# Patient Record
Sex: MALE | Race: WHITE | NOT HISPANIC OR LATINO | Employment: UNEMPLOYED | URBAN - METROPOLITAN AREA
[De-identification: names, ages, dates, MRNs, and addresses within clinical notes are randomized per-mention and may not be internally consistent; named-entity substitution may affect disease eponyms.]

---

## 2024-09-17 ENCOUNTER — APPOINTMENT (OUTPATIENT)
Dept: CARDIOLOGY | Facility: HOSPITAL | Age: 69
End: 2024-09-17
Payer: MEDICARE

## 2024-09-26 ENCOUNTER — HOSPITAL ENCOUNTER (OUTPATIENT)
Dept: CARDIOLOGY | Facility: HOSPITAL | Age: 69
Discharge: HOME | End: 2024-09-26
Payer: MEDICARE

## 2024-09-26 DIAGNOSIS — B94.8 SEQUELAE OF OTHER SPECIFIED INFECTIOUS AND PARASITIC DISEASES: ICD-10-CM

## 2024-09-26 DIAGNOSIS — U07.1 COVID-19: ICD-10-CM

## 2024-09-26 LAB
AORTIC VALVE MEAN GRADIENT: 3 MMHG
AORTIC VALVE PEAK VELOCITY: 1.14 M/S
AV PEAK GRADIENT: 5.2 MMHG
AVA (PEAK VEL): 3 CM2
AVA (VTI): 2.79 CM2
EJECTION FRACTION APICAL 4 CHAMBER: 63.3
EJECTION FRACTION: 60 %
LEFT ATRIUM VOLUME AREA LENGTH INDEX BSA: 17.5 ML/M2
LEFT VENTRICLE INTERNAL DIMENSION DIASTOLE: 4.5 CM (ref 3.5–6)
LEFT VENTRICULAR OUTFLOW TRACT DIAMETER: 2.1 CM
LV EJECTION FRACTION BIPLANE: 60 %
MITRAL VALVE E/A RATIO: 0.74
MITRAL VALVE E/E' RATIO: 5.6
TRICUSPID ANNULAR PLANE SYSTOLIC EXCURSION: 2.8 CM

## 2024-09-26 PROCEDURE — 93306 TTE W/DOPPLER COMPLETE: CPT

## 2024-09-26 PROCEDURE — 2500000004 HC RX 250 GENERAL PHARMACY W/ HCPCS (ALT 636 FOR OP/ED): Performed by: INTERNAL MEDICINE

## 2024-09-26 PROCEDURE — 93306 TTE W/DOPPLER COMPLETE: CPT | Performed by: INTERNAL MEDICINE

## 2024-09-30 ENCOUNTER — TELEPHONE (OUTPATIENT)
Dept: CARDIOLOGY | Facility: HOSPITAL | Age: 69
End: 2024-09-30
Payer: MEDICARE

## 2024-09-30 ENCOUNTER — TELEPHONE (OUTPATIENT)
Dept: GASTROENTEROLOGY | Facility: CLINIC | Age: 69
End: 2024-09-30
Payer: MEDICARE

## 2024-10-07 ENCOUNTER — TELEPHONE (OUTPATIENT)
Dept: CARDIOLOGY | Facility: HOSPITAL | Age: 69
End: 2024-10-07
Payer: MEDICARE

## 2024-10-07 NOTE — TELEPHONE ENCOUNTER
Called and LVM asking if the pt would like to be seen sooner and togive us a call back if he is interested.    Amador PHILLIPS

## 2024-10-08 ENCOUNTER — TELEPHONE (OUTPATIENT)
Dept: GASTROENTEROLOGY | Facility: CLINIC | Age: 69
End: 2024-10-08
Payer: MEDICARE

## 2024-10-08 NOTE — TELEPHONE ENCOUNTER
----- Message from Addie RODRIGUEZ sent at 10/8/2024 11:06 AM EDT -----  Regarding: RE: OPEN ACCESS  Jeanne Phillips,   Can you review the Chart for this Patient? He has an appointment with Dr. Marx on 10.17.24 for an abnormal ECHO. His referral is in the Media section of the chart. ECHO was done by Dr. Reed on 9.7.24 in the Cardiology section. Thank you so much!  Tim  ----- Message -----  From: Adore Savage RN  Sent: 10/7/2024   2:50 PM EDT  To: [id number]er[facility]  Subject: OPEN ACCESS                                      [facility]

## 2024-10-10 ENCOUNTER — APPOINTMENT (OUTPATIENT)
Dept: UROLOGY | Facility: CLINIC | Age: 69
End: 2024-10-10
Payer: MEDICARE

## 2024-10-10 VITALS
HEART RATE: 75 BPM | SYSTOLIC BLOOD PRESSURE: 190 MMHG | DIASTOLIC BLOOD PRESSURE: 111 MMHG | WEIGHT: 240 LBS | BODY MASS INDEX: 33.6 KG/M2 | HEIGHT: 71 IN

## 2024-10-10 DIAGNOSIS — R97.20 ELEVATED PSA: ICD-10-CM

## 2024-10-10 DIAGNOSIS — N40.1 BENIGN PROSTATIC HYPERPLASIA WITH LOWER URINARY TRACT SYMPTOMS, SYMPTOM DETAILS UNSPECIFIED: Primary | ICD-10-CM

## 2024-10-10 LAB
POC BILIRUBIN, URINE: NEGATIVE
POC BLOOD, URINE: NEGATIVE
POC GLUCOSE, URINE: NEGATIVE MG/DL
POC KETONES, URINE: ABNORMAL MG/DL
POC LEUKOCYTES, URINE: NEGATIVE
POC NITRITE,URINE: NEGATIVE
POC PH, URINE: 7 PH
POC PROTEIN, URINE: NEGATIVE MG/DL
POC SPECIFIC GRAVITY, URINE: 1.02
POC UROBILINOGEN, URINE: 0.2 EU/DL

## 2024-10-10 PROCEDURE — 81003 URINALYSIS AUTO W/O SCOPE: CPT | Performed by: UROLOGY

## 2024-10-10 PROCEDURE — 1159F MED LIST DOCD IN RCRD: CPT | Performed by: UROLOGY

## 2024-10-10 PROCEDURE — 99203 OFFICE O/P NEW LOW 30 MIN: CPT | Performed by: UROLOGY

## 2024-10-10 NOTE — PROGRESS NOTES
10/10/2024  68-year-old gentleman presents an elevated PSA.  Voiding well    History of kidney stone status post of lithotripsy    Exam: Circumcised normal penis normal testes bilaterally    DENIS: 1+, no nodule    We discussed elevated PSA, prostate biopsy versus monitoring PSA  We discussed benign prostate hypertrophy with mild voiding symptom  We discussed the history of kidney stone  All the questions were answered, the patient expressed understanding and agreed to the plan.    Impression  Elevated PSA  BPH  History of kidney stone    Plan  Conservative management for now  Repeat PSA in 6 months  Watch voiding  Appointment in 6 months  Possible prostate biopsy in the future    Chief Complaint   Patient presents with    Elevated PSA     Patient is here today For new patient visit for elevated psa per the request of Florencia Garay at [facility] in [place] .        Physical Exam     TODAYS LAB RESULTS:  Last psa was 9-30-24 was 5.30    Glucose, Urine  NEGATIVE mg/dl NEGATIVE   POC Bilirubin, Urine  NEGATIVE NEGATIVE   POC Ketones, Urine  NEGATIVE mg/dl TRACE Abnormal    POC Specific Gravity, Urine  1.005 - 1.035 1.020   POC Blood, Urine  NEGATIVE NEGATIVE   POC PH, Urine  No Reference Range Established PH 7.0   POC Protein, Urine  NEGATIVE, 30 (1+) mg/dl NEGATIVE   POC Urobilinogen, Urine  0.2, 1.0 EU/DL 0.2   Poc Nitrite, Urine  NEGATIVE NEGATIVE   POC Leukocytes, Urine  NEGATIVE NEGATIVE                    ASSESSMENT&PLAN:      IMPRESSIONS:    PSA   9-30-24 was 5.30

## 2024-10-17 ENCOUNTER — OFFICE VISIT (OUTPATIENT)
Dept: CARDIOLOGY | Facility: HOSPITAL | Age: 69
End: 2024-10-17
Payer: MEDICARE

## 2024-10-17 VITALS
HEART RATE: 74 BPM | SYSTOLIC BLOOD PRESSURE: 138 MMHG | DIASTOLIC BLOOD PRESSURE: 90 MMHG | BODY MASS INDEX: 33.6 KG/M2 | HEIGHT: 71 IN | OXYGEN SATURATION: 98 % | WEIGHT: 240 LBS

## 2024-10-17 DIAGNOSIS — R42 DIZZINESS: ICD-10-CM

## 2024-10-17 DIAGNOSIS — I51.9 LEFT VENTRICULAR DIASTOLIC DYSFUNCTION: Primary | ICD-10-CM

## 2024-10-17 DIAGNOSIS — I10 BENIGN ESSENTIAL HYPERTENSION: ICD-10-CM

## 2024-10-17 PROBLEM — M19.90 ARTHRITIS: Status: ACTIVE | Noted: 2024-10-17

## 2024-10-17 PROBLEM — I25.10 CORONARY ARTERIOSCLEROSIS: Status: ACTIVE | Noted: 2024-10-17

## 2024-10-17 PROBLEM — H26.9 CATARACT: Status: ACTIVE | Noted: 2024-10-17

## 2024-10-17 PROBLEM — J30.9 ALLERGIC RHINITIS: Status: ACTIVE | Noted: 2024-10-17

## 2024-10-17 PROBLEM — E87.6 HYPOKALEMIA: Status: ACTIVE | Noted: 2024-10-17

## 2024-10-17 PROBLEM — E78.5 HYPERLIPIDEMIA: Status: ACTIVE | Noted: 2024-10-17

## 2024-10-17 PROBLEM — R97.20 ELEVATED PSA: Status: ACTIVE | Noted: 2024-10-17

## 2024-10-17 PROBLEM — G56.20 LESION OF ULNAR NERVE: Status: ACTIVE | Noted: 2024-10-17

## 2024-10-17 PROCEDURE — 93005 ELECTROCARDIOGRAM TRACING: CPT | Performed by: STUDENT IN AN ORGANIZED HEALTH CARE EDUCATION/TRAINING PROGRAM

## 2024-10-17 PROCEDURE — 3080F DIAST BP >= 90 MM HG: CPT | Performed by: STUDENT IN AN ORGANIZED HEALTH CARE EDUCATION/TRAINING PROGRAM

## 2024-10-17 PROCEDURE — 3075F SYST BP GE 130 - 139MM HG: CPT | Performed by: STUDENT IN AN ORGANIZED HEALTH CARE EDUCATION/TRAINING PROGRAM

## 2024-10-17 PROCEDURE — 1036F TOBACCO NON-USER: CPT | Performed by: STUDENT IN AN ORGANIZED HEALTH CARE EDUCATION/TRAINING PROGRAM

## 2024-10-17 PROCEDURE — 3008F BODY MASS INDEX DOCD: CPT | Performed by: STUDENT IN AN ORGANIZED HEALTH CARE EDUCATION/TRAINING PROGRAM

## 2024-10-17 PROCEDURE — 1159F MED LIST DOCD IN RCRD: CPT | Performed by: STUDENT IN AN ORGANIZED HEALTH CARE EDUCATION/TRAINING PROGRAM

## 2024-10-17 PROCEDURE — 99204 OFFICE O/P NEW MOD 45 MIN: CPT | Performed by: STUDENT IN AN ORGANIZED HEALTH CARE EDUCATION/TRAINING PROGRAM

## 2024-10-17 PROCEDURE — 99214 OFFICE O/P EST MOD 30 MIN: CPT | Performed by: STUDENT IN AN ORGANIZED HEALTH CARE EDUCATION/TRAINING PROGRAM

## 2024-10-17 PROCEDURE — 93010 ELECTROCARDIOGRAM REPORT: CPT | Performed by: STUDENT IN AN ORGANIZED HEALTH CARE EDUCATION/TRAINING PROGRAM

## 2024-10-17 RX ORDER — LOSARTAN POTASSIUM AND HYDROCHLOROTHIAZIDE 12.5; 1 MG/1; MG/1
1 TABLET ORAL
COMMUNITY
Start: 2023-12-07 | End: 2024-10-17 | Stop reason: WASHOUT

## 2024-10-17 RX ORDER — ASPIRIN 81 MG/1
81 TABLET ORAL DAILY
COMMUNITY
Start: 2024-09-30

## 2024-10-17 RX ORDER — AMLODIPINE BESYLATE 5 MG/1
5 TABLET ORAL DAILY
COMMUNITY
End: 2024-10-17 | Stop reason: SDUPTHER

## 2024-10-17 RX ORDER — VIT A/VIT C/VIT E/ZINC/COPPER 4296-226
1 CAPSULE ORAL DAILY
COMMUNITY

## 2024-10-17 RX ORDER — AMLODIPINE BESYLATE 2.5 MG/1
2.5 TABLET ORAL DAILY
Qty: 30 TABLET | Refills: 11 | Status: SHIPPED | OUTPATIENT
Start: 2024-10-17 | End: 2025-10-17

## 2024-10-17 RX ORDER — BUTYROSPERMUM PARKII(SHEA BUTTER), SIMMONDSIA CHINENSIS (JOJOBA) SEED OIL, ALOE BARBADENSIS LEAF EXTRACT .01; 1; 3.5 G/100G; G/100G; G/100G
250 LIQUID TOPICAL DAILY
COMMUNITY

## 2024-10-17 RX ORDER — VITAMIN B COMPLEX
1 CAPSULE ORAL DAILY
COMMUNITY

## 2024-10-17 RX ORDER — FLUTICASONE PROPIONATE 50 MCG
1 SPRAY, SUSPENSION (ML) NASAL 2 TIMES DAILY
COMMUNITY

## 2024-10-17 RX ORDER — CALCIUM CARB, CITRATE, MALATE 250 MG
1 CAPSULE ORAL DAILY
COMMUNITY

## 2024-10-17 ASSESSMENT — ENCOUNTER SYMPTOMS
LOSS OF SENSATION IN FEET: 0
DEPRESSION: 0
OCCASIONAL FEELINGS OF UNSTEADINESS: 0

## 2024-10-17 NOTE — PROGRESS NOTES
[facility] V[facility] Cardiology Clinic Note    Date: 10/17/24  Time: 8:31 AM    Subjective   Manolo Lopez is a 68 y.o. male who HLD, HTN, referred for abnormal echo-diastolic dysfunction.  Patient had echocardiogram done by the PCP and showed mild diastolic dysfunction for which patient was referred.  Patient has no symptoms of heart failure.  Patient has no PND, orthopnea, edema.  Patient has had high blood pressure for quite some time.  Patient was taking medical therapy for the same but then he has cut down on his antihypertensive medications.  This is because on occasion he will have some low blood pressures.  This has happened after he had COVID.  He reports once in a while almost once a month he will notice his blood pressure to drop especially after he walks his dog for quite some time.  More likely to happen in the morning hours.  He is usually drinking around 64 ounces of fluid a day.  No syncope.  No chest pain or dyspnea.  He does have neuropathy.  He is not diagnosed with Parkinson.  He reports that when he feels he is hypotensive he has tunnel vision and has to sit down because of weakness.      Review of Systems:  Otherwise, limited cardiovascular review of systems is negative.        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  Surgical History:   Past Surgical History:   Procedure Laterality Date    EXTRACORPOREAL SHOCK WAVE LITHOTRIPSY      HERNIA REPAIR      LITHOTRIPSY      TONSILLECTOMY     PSHP@  Social History:   Social Drivers of Health with Concerns     Tobacco Use: Unknown (10/10/2024)    Patient History     Smoking Tobacco Use: Never     Smokeless Tobacco Use: Unknown     Passive Exposure: Not on file   Alcohol Use: Not on file   Financial Resource Strain: Not on file   Food Insecurity: Not on file   Transportation Needs: Not on file   Physical Activity: Not on file   Stress: Not on file   Social  Connections: Not on file   Intimate Partner Violence: Not on file   Housing Stability: Not on file   Utilities: Not on file   Digital Equity: Not on file   Health Literacy: Not on file     Family History:   No family history on file.   Allergies:  Patient has no known allergies.    Outpatient Medications:  No current outpatient medications    Objective     Physical Exam  There were no vitals filed for this visit.  Wt Readings from Last 3 Encounters:   10/10/24 109 kg (240 lb)       General: Alert and Oriented, No distress, cooperative  Head: Normocephalic without obvious abnormality, atraumatic  Eyes: Conjunctiva/corneas clear, EOM's grossly intact  Neck: Supple, trachea midline, No thyroid enlargement/tenderness/nodules; No JVD  Lungs: Clear to auscultation bilaterally, no wheezes, rhonci, or rales. respirations unlabored  Chest Wall: No tenderness or deformity  Heart: Regular rhythm, normal S1/S2, no murmur  Abdomen: Soft, non-tender, Non-distended, bowel sounds active  Extremities: No edema, no cyanosis, no clubbing  Skin: Skin color, texture, turgor normal.  No rashes or lesions noted  Neurologic: Alert and oriented x 3, grossly moving all extremities, speech intact        I have personally reviewed the following images and laboratory findings:  ECG: normal sinus rhythm, no blocks or conduction defects, no ischemic changes  Echocardiogram:     PHYSICIAN INTERPRETATION:  Left Ventricle: The left ventricular systolic function is normal, with a Whyte's biplane calculated ejection fraction of 60%. There are no regional wall motion abnormalities. The left ventricular cavity size is normal. Spectral Doppler shows an impaired relaxation pattern of left ventricular diastolic filling.  Left Atrium: The left atrium is normal in size.  Right Ventricle: The right ventricle is normal in size. There is normal right ventricular global systolic function.  Right Atrium: The right atrium is normal in size.  Aortic Valve: The  aortic valve is trileaflet. The aortic valve dimensionless index is 0.81. There is no evidence of aortic valve regurgitation. The peak instantaneous gradient of the aortic valve is 5.2 mmHg. The mean gradient of the aortic valve is 3.0 mmHg.  Mitral Valve: The mitral valve was not well visualized. There is no evidence of mitral valve regurgitation.  Tricuspid Valve: The tricuspid valve was not well visualized. There is trace tricuspid regurgitation.  Pulmonic Valve: The pulmonic valve is not well visualized. There is no indication of pulmonic valve regurgitation.  Pericardium: No pericardial effusion noted.  Aorta: The aortic root is normal. There is mild dilatation of the ascending aorta.  Systemic Veins: The inferior vena cava was not well visualized.        CONCLUSIONS:   1. The left ventricular systolic function is normal, with a Whyte's biplane calculated ejection fraction of 60%.   2. Spectral Doppler shows an impaired relaxation pattern of left ventricular diastolic filling.   3. There is normal right ventricular global systolic function.       Laboratory values:   Office Visit on 10/10/2024   Component Date Value    POC Glucose, Urine 10/10/2024 NEGATIVE     POC Bilirubin, Urine 10/10/2024 NEGATIVE     POC Ketones, Urine 10/10/2024 TRACE (A)     POC Specific Gravity, Ur* 10/10/2024 1.020     POC Blood, Urine 10/10/2024 NEGATIVE     POC PH, Urine 10/10/2024 7.0     POC Protein, Urine 10/10/2024 NEGATIVE     POC Urobilinogen, Urine 10/10/2024 0.2     Poc Nitrite, Urine 10/10/2024 NEGATIVE     POC Leukocytes, Urine 10/10/2024 NEGATIVE    Hospital Outpatient Visit on 09/26/2024   Component Date Value    LVOT diam 09/26/2024 2.10     LV Biplane EF 09/26/2024 60     MV E/A ratio 09/26/2024 0.74     MV avg E/e' ratio 09/26/2024 5.60     Tricuspid annular plane * 09/26/2024 2.8     AV mn grad 09/26/2024 3.0     LA vol index A/L 09/26/2024 17.5     AV pk jose 09/26/2024 1.14     LV EF 09/26/2024 60     LVIDd  "09/26/2024 4.50     Aortic Valve Area by Con* 09/26/2024 2.79     Aortic Valve Area by Con* 09/26/2024 3.00     AV pk grad 09/26/2024 5.2     LV A4C EF 09/26/2024 63.3      CBC -  No results found for: \"WBC\", \"HGB\", \"HCT\", \"MCV\", \"PLT\"    CMP -  No results found for: \"CALCIUM\", \"PHOS\", \"PROT\", \"ALBUMIN\", \"AST\", \"ALT\", \"ALKPHOS\", \"BILITOT\"    LIPID PANEL -   No results found for: \"CHOL\", \"HDL\", \"CHHDL\", \"LDL\", \"VLDL\", \"TRIG\", \"NHDL\"    RENAL FUNCTION PANEL -   No results found for: \"GLU\", \"K\", \"CHLOR\", \"PHOS\"    No results found for: \"BNP\", \"HGBA1C\"     Assessment/Plan   Essential hypertension  Diastolic dysfunction  Dizziness/presyncope    Plan:  -I will obtain Holter monitor to assess for any malignant arrhythmia.  -I will recommend to start on low-dose amlodipine 2.5 mg daily.  -I will continue aspirin 81 mg daily.  -I encourage patient to check blood pressure at different times of the day  -Keep yourself hydrated with at least 64 ounces of fluid daily.  Discussed regarding lifestyle modification and slowly raising up from laying or sitting position.    RTC as scheduled    In addition, the following orders were placed today:  No orders of the defined types were placed in this encounter.                SIGNATURE: Richardson Marx MD PATIENT NAME: Manolo Lopez   DATE/TIME: October 17, 2024 8:31 AM MRN: [id number]                             "

## 2024-10-23 ENCOUNTER — ANCILLARY PROCEDURE (OUTPATIENT)
Dept: CARDIOLOGY | Facility: HOSPITAL | Age: 69
End: 2024-10-23
Payer: MEDICARE

## 2024-10-23 DIAGNOSIS — I10 BENIGN ESSENTIAL HYPERTENSION: ICD-10-CM

## 2024-10-23 DIAGNOSIS — R42 DIZZINESS: ICD-10-CM

## 2024-10-23 PROCEDURE — 93246 EXT ECG>7D<15D RECORDING: CPT

## 2024-10-23 PROCEDURE — 93248 EXT ECG>7D<15D REV&INTERPJ: CPT | Performed by: INTERNAL MEDICINE

## 2024-10-23 NOTE — PROGRESS NOTES
Angie placed a Holter on the patient on 10/17/24 for 14 days     Patient was told what can and can not be done while wearing it     Patient showed understanding

## 2024-11-11 ENCOUNTER — TELEPHONE (OUTPATIENT)
Dept: CARDIOLOGY | Facility: HOSPITAL | Age: 69
End: 2024-11-11
Payer: MEDICARE

## 2024-11-11 NOTE — TELEPHONE ENCOUNTER
----- Message from Amador ROSARIO sent at 11/11/2024  9:39 AM EST -----  Regarding: Needs Holter results    ----- Message -----  From: Richardson Marx MD  Sent: 11/11/2024   8:46 AM EST  To: Terrence Bee RN; Amador Escobedo        Let him know that there is NSVT on his holter. Set him up for nuclear stress test for ischemia evaluation and start on metoprolol XL 25 mg daily if not taking any beta blocker already.   See me in clinic after stress test done. If he is having syncope or chest pain or any worsening symptoms then see me soon.  ----- Message -----  From: Kenny Reed DO  Sent: 11/8/2024  12:11 PM EST  To: Richardson Marx MD

## 2024-11-13 DIAGNOSIS — R94.31 ABNORMAL ELECTROCARDIOGRAM (ECG) (EKG): ICD-10-CM

## 2024-11-13 DIAGNOSIS — I51.9 LEFT VENTRICULAR DIASTOLIC DYSFUNCTION: ICD-10-CM

## 2024-11-13 DIAGNOSIS — I47.29 NSVT (NONSUSTAINED VENTRICULAR TACHYCARDIA) (MULTI): Primary | ICD-10-CM

## 2024-11-13 RX ORDER — METOPROLOL SUCCINATE 25 MG/1
25 TABLET, EXTENDED RELEASE ORAL DAILY
Qty: 90 TABLET | Refills: 1 | Status: SHIPPED | OUTPATIENT
Start: 2024-11-13 | End: 2025-11-13

## 2024-12-03 ENCOUNTER — TELEPHONE (OUTPATIENT)
Facility: CLINIC | Age: 69
End: 2024-12-03
Payer: MEDICARE

## 2024-12-03 NOTE — TELEPHONE ENCOUNTER
Left a message for patient to schedule NP OV for OA Fail due to abnormal echo and canceling cardiac testing.  OV required per anesthiologist

## 2024-12-03 NOTE — TELEPHONE ENCOUNTER
----- Message from Addie RODRIGUEZ sent at 12/2/2024  1:53 PM EST -----  Regarding: Patient needs to have OV    ----- Message -----  From: TIGRE Zapata  Sent: 12/2/2024   1:29 PM EST  To: Addie Parrish MA  Subject: RE: OPEN ACCESS                                  Yes OV thanks  ----- Message -----  From: Addie Parrish MA  Sent: 12/2/2024   9:56 AM EST  To: TIGRE Zapata  Subject: RE: OPEN ACCESS                                  Hi Alan!    Looks like the Patient has canceled all his further cardiac testing. At this point do you recommend that we just schedule him a OV appointment? Thank you so much!  ----- Message -----  From: Addie Parrish MA  Sent: 11/11/2024   1:11 PM EST  To: Addie Parrish MA  Subject: RE: OPEN ACCESS                                  PER Alan:  DR CRUZ SAW SVT ON HOLTER AND ORDERED STRESS, NEED TO WAIT FOR STRESS THEN FOLLOW UP. THANKS  ----- Message -----  From: Addie Parrish MA  Sent: 11/11/2024   8:05 AM EST  To: Addie Parrish MA  Subject: RE: OPEN ACCESS                                  Can you review Holter Monitor result and advise if it is ok to still schedule his OA Colonoscopy? Thanks Alan!  ----- Message -----  From: Addie Parrish MA  Sent: 10/28/2024   8:55 AM EST  To: Addie Parrish MA  Subject: RE: OPEN ACCESS                                  Per Alan Steve:  Patient should wait until Holter monitor is done.  ----- Message -----  From: Addie Parrish MA  Sent: 10/22/2024   7:55 AM EDT  To: TIGRE Zapata; Addie Parrish MA  Subject: RE: OPEN ACCESS                                  So Dr. Marx now wants the Patient to have a Holter Monitor done. Do you now recommend that we wait until that is completed? Thank you!  ----- Message -----  From: Addie Parrish MA  Sent: 10/8/2024   2:02 PM EDT  To:  [id number]er[facility]  Subject: RE: OPEN ACCESS                                  Per Alan wait until he follows up with Dr. Marx  ----- Message  -----  From: Addie Parrish MA  Sent: 10/8/2024  11:19 AM EDT  To: Do [id number]er[facility]  Subject: RE: OPEN ACCESS                                    ----- Message -----  From: Addie Parrish MA  Sent: 10/8/2024  11:09 AM EDT  To: Adore Savage RN; #  Subject: RE: OPEN ACCESS                                  Jeanne Phillips,   Can you review the Chart for this Patient? He has an appointment with Dr. Marx on 10.17.24 for an abnormal ECHO. His referral is in the Media section of the chart. ECHO was done by Dr. Reed on 9.7.24 in the Cardiology section. Thank you so much!  Tim  ----- Message -----  From: Adore Savage RN  Sent: 10/7/2024   2:50 PM EDT  To: Do [id number]er[facility]  Subject: OPEN ACCESS                                      [facility]

## 2024-12-05 ENCOUNTER — APPOINTMENT (OUTPATIENT)
Dept: CARDIOLOGY | Facility: HOSPITAL | Age: 69
End: 2024-12-05
Payer: MEDICARE

## 2025-01-16 ENCOUNTER — APPOINTMENT (OUTPATIENT)
Dept: CARDIOLOGY | Facility: HOSPITAL | Age: 70
End: 2025-01-16
Payer: MEDICARE

## 2025-04-10 ENCOUNTER — APPOINTMENT (OUTPATIENT)
Dept: UROLOGY | Facility: CLINIC | Age: 70
End: 2025-04-10
Payer: MEDICARE

## 2025-05-21 NOTE — TELEPHONE ENCOUNTER
Received another referral for the Patient to have a screening Colonoscopy. Faxing request back to PCP due to multiple attempts made to contact the Patient with our Letter. Patient will need to contact the office at this point.